# Patient Record
Sex: FEMALE
[De-identification: names, ages, dates, MRNs, and addresses within clinical notes are randomized per-mention and may not be internally consistent; named-entity substitution may affect disease eponyms.]

---

## 2020-08-02 ENCOUNTER — HOSPITAL ENCOUNTER (INPATIENT)
Dept: HOSPITAL 97 - 2ND | Age: 50
LOS: 2 days | Discharge: HOME | DRG: 445 | End: 2020-08-04
Attending: SURGERY | Admitting: SURGERY
Payer: COMMERCIAL

## 2020-08-02 VITALS — BODY MASS INDEX: 23.8 KG/M2

## 2020-08-02 DIAGNOSIS — R06.02: ICD-10-CM

## 2020-08-02 DIAGNOSIS — Z88.1: ICD-10-CM

## 2020-08-02 DIAGNOSIS — K80.00: Primary | ICD-10-CM

## 2020-08-02 DIAGNOSIS — J44.1: ICD-10-CM

## 2020-08-02 DIAGNOSIS — Z90.711: ICD-10-CM

## 2020-08-02 DIAGNOSIS — F17.210: ICD-10-CM

## 2020-08-02 DIAGNOSIS — Z79.899: ICD-10-CM

## 2020-08-02 DIAGNOSIS — N83.209: ICD-10-CM

## 2020-08-02 DIAGNOSIS — R50.9: ICD-10-CM

## 2020-08-02 DIAGNOSIS — Z20.828: ICD-10-CM

## 2020-08-02 LAB
ALBUMIN SERPL BCP-MCNC: 2.6 G/DL (ref 3.4–5)
ALP SERPL-CCNC: 160 U/L (ref 45–117)
ALT SERPL W P-5'-P-CCNC: 92 U/L (ref 12–78)
AST SERPL W P-5'-P-CCNC: 35 U/L (ref 15–37)
BLD SMEAR INTERP: (no result)
BUN BLD-MCNC: 8 MG/DL (ref 7–18)
CRP SERPL-MCNC: 97.3 MG/L (ref ?–3)
FERRITIN SERPL-MCNC: 295.5 NG/ML (ref 8–388)
GLUCOSE SERPLBLD-MCNC: 108 MG/DL (ref 74–106)
HCT VFR BLD CALC: 31.6 % (ref 36–45)
LYMPHOCYTES # SPEC AUTO: 0.8 K/UL (ref 0.7–4.9)
MORPHOLOGY BLD-IMP: (no result)
PMV BLD: 7.7 FL (ref 7.6–11.3)
POTASSIUM SERPL-SCNC: 3.6 MMOL/L (ref 3.5–5.1)
RBC # BLD: 3.43 M/UL (ref 3.86–4.86)
UA COMPLETE W REFLEX CULTURE PNL UR: (no result)
UA DIPSTICK W REFLEX MICRO PNL UR: (no result)

## 2020-08-02 PROCEDURE — 87070 CULTURE OTHR SPECIMN AEROBIC: CPT

## 2020-08-02 PROCEDURE — 76705 ECHO EXAM OF ABDOMEN: CPT

## 2020-08-02 PROCEDURE — 36415 COLL VENOUS BLD VENIPUNCTURE: CPT

## 2020-08-02 PROCEDURE — 85025 COMPLETE CBC W/AUTO DIFF WBC: CPT

## 2020-08-02 PROCEDURE — 87804 INFLUENZA ASSAY W/OPTIC: CPT

## 2020-08-02 PROCEDURE — 86140 C-REACTIVE PROTEIN: CPT

## 2020-08-02 PROCEDURE — 85652 RBC SED RATE AUTOMATED: CPT

## 2020-08-02 PROCEDURE — 87081 CULTURE SCREEN ONLY: CPT

## 2020-08-02 PROCEDURE — 81003 URINALYSIS AUTO W/O SCOPE: CPT

## 2020-08-02 PROCEDURE — 71045 X-RAY EXAM CHEST 1 VIEW: CPT

## 2020-08-02 PROCEDURE — 85379 FIBRIN DEGRADATION QUANT: CPT

## 2020-08-02 PROCEDURE — 80053 COMPREHEN METABOLIC PANEL: CPT

## 2020-08-02 PROCEDURE — 81015 MICROSCOPIC EXAM OF URINE: CPT

## 2020-08-02 PROCEDURE — 71275 CT ANGIOGRAPHY CHEST: CPT

## 2020-08-02 PROCEDURE — 87040 BLOOD CULTURE FOR BACTERIA: CPT

## 2020-08-02 PROCEDURE — 87088 URINE BACTERIA CULTURE: CPT

## 2020-08-02 PROCEDURE — 82728 ASSAY OF FERRITIN: CPT

## 2020-08-02 PROCEDURE — 84145 PROCALCITONIN (PCT): CPT

## 2020-08-02 PROCEDURE — 70450 CT HEAD/BRAIN W/O DYE: CPT

## 2020-08-02 PROCEDURE — 87086 URINE CULTURE/COLONY COUNT: CPT

## 2020-08-02 RX ADMIN — ENOXAPARIN SODIUM SCH MG: 60 INJECTION SUBCUTANEOUS at 18:24

## 2020-08-02 RX ADMIN — ONDANSETRON PRN MG: 2 INJECTION INTRAMUSCULAR; INTRAVENOUS at 05:59

## 2020-08-02 RX ADMIN — SODIUM CHLORIDE AND POTASSIUM CHLORIDE SCH MLS: .9; .15 SOLUTION INTRAVENOUS at 13:53

## 2020-08-02 RX ADMIN — SODIUM CHLORIDE AND POTASSIUM CHLORIDE SCH: .9; .15 SOLUTION INTRAVENOUS at 12:59

## 2020-08-02 RX ADMIN — ACETAMINOPHEN PRN MG: 500 TABLET, FILM COATED ORAL at 21:05

## 2020-08-02 RX ADMIN — PIPERACILLIN SODIUM AND TAZOBACTAM SODIUM SCH MLS: 3; .375 INJECTION, POWDER, LYOPHILIZED, FOR SOLUTION INTRAVENOUS at 06:04

## 2020-08-02 RX ADMIN — SODIUM CHLORIDE AND POTASSIUM CHLORIDE SCH MLS: .9; .15 SOLUTION INTRAVENOUS at 04:13

## 2020-08-02 RX ADMIN — MOMETASONE FUROATE AND FORMOTEROL FUMARATE DIHYDRATE SCH PUFF: 100; 5 AEROSOL RESPIRATORY (INHALATION) at 21:03

## 2020-08-02 RX ADMIN — PIPERACILLIN SODIUM AND TAZOBACTAM SODIUM SCH MLS: 3; .375 INJECTION, POWDER, LYOPHILIZED, FOR SOLUTION INTRAVENOUS at 23:06

## 2020-08-02 RX ADMIN — PIPERACILLIN SODIUM AND TAZOBACTAM SODIUM SCH MLS: 3; .375 INJECTION, POWDER, LYOPHILIZED, FOR SOLUTION INTRAVENOUS at 18:23

## 2020-08-02 RX ADMIN — ACETAMINOPHEN PRN MG: 500 TABLET, FILM COATED ORAL at 12:39

## 2020-08-02 RX ADMIN — SODIUM CHLORIDE AND POTASSIUM CHLORIDE SCH MLS: .9; .15 SOLUTION INTRAVENOUS at 23:06

## 2020-08-02 RX ADMIN — PIPERACILLIN SODIUM AND TAZOBACTAM SODIUM SCH MLS: 3; .375 INJECTION, POWDER, LYOPHILIZED, FOR SOLUTION INTRAVENOUS at 12:12

## 2020-08-02 RX ADMIN — FAMOTIDINE SCH MG: 10 INJECTION, SOLUTION INTRAVENOUS at 08:00

## 2020-08-02 NOTE — RAD REPORT
EXAM DESCRIPTION:  US - Abdomen Exam Limited - 8/2/2020 9:28 am

 

CLINICAL HISTORY:  Abdominal pain.

 

COMPARISON:  None.

 

FINDINGS:   The gallbladder wall is borderline thickened. . A gallstone is not seen.

 

The biliary tree is normal caliber.

 

IMPRESSION:  Borderline thickening of the gallbladder wall. A gallstone is not visualized

## 2020-08-02 NOTE — RAD REPORT
EXAM DESCRIPTION:  [place] Single View8/2/2020 1:59 pm

 

CLINICAL HISTORY:  fever

 

COMPARISON:  none

 

FINDINGS:   Lung bases are mildly hazy

 

The heart is normal size

 

IMPRESSION:   Lung bases are mildly hazy which may indicate pneumonia

## 2020-08-02 NOTE — P.CNS
Date of Consult: 08/02/20


Reason for Consult: SOB


Requesting Physician: Hugh Duvall


Chief Complaint: Acute cholecystitis


History of Present Illness: 





50-year-old female with a past medical history of smoking cigarettes x1 pack per

day for greater than 30 years, depression and cardiomyopathy 13 years ago after 

the birth of her twins presented to the out test ER with complaints of abdominal

pain. Described it as "pain" in the right upper quadrant.  No radiation.  

Described the pain between a 7 and 8/10.  Patient was sent to [facility] with 

concerns for acute cholecystitis with cholelithiasis.  CT abdomen pelvis year 

showed some mild ascitic fluid.  Ultrasound showed a gallbladder wall thickening

but no stones or sludge.  Patient was started on IV Zosyn. 


Per surgery-Dr. Galdamez.  Initially this patient was thought to have acute 

cholecystitis with cholelithiasis.  However she has some other symptoms that 

could be consistent with covid.  Patient is on IV antibiotic and pain medicine. 

Will give her regular diet, make her NPO at midnight, and re-evaluate in the 

a.m.





On examination patient is eating a regular diet.  She is not wearing her oxygen 

despite having room air saturations of 86%.  Patient is requiring between 2-4 L 

nasal cannula to maintain oxygen saturations greater than 93%.  States that she 

just got back from the bathroom and was eating her food before putting the 

oxygen back on.  Was just informed that the patient is call the test is 

negative.  





Allergies





macrobid Allergy (Severe, Uncoded 08/02/20 03:43)


   Itching/Hives/Rash





Home Medications: 








Ciprofloxacin HCl [Cipro 500 MG Tablet] 1 tab PO BID 08/02/20 


Escitalopram Oxalate [Lexapro] 1 tab PO DAILY 08/02/20 


Montelukast [Singulair*] 1 tab PO DAILY 08/02/20 








- Past Medical/Surgical History


Diabetic: No


-: smoker


-: cardiomyopathy 13 years ago after delivery


-: depression


-: allergic rhinitis


-: menopausal symptom


-: 2x CS


-: right thumb surgery


-: left eye mesh plate placement


-: toe surgery on юлия feet


-: partial hysterectomy


Psychosocial/ Personal History: Lives at home





- Family History


  ** Sister


Medical History: Cancer


Notes: breast





- Social History


Smoking Status: Current every day smoker


Alcohol use: Yes


CD- Drugs: No


Caffeine use: No


Place of Residence: Home





Review of Systems


General: Other (Headache), As per HPI


Eyes: Unremarkable


ENT: Unremarkable


Respiratory: Shortness of Breath, As per HPI


Cardiovascular: Unremarkable


Gastrointestinal: Abdominal Pain, No Distention


Genitourinary: Unremarkable


Musculoskeletal: Unremarkable


Integumentary: Unremarkable


Neurological: Unremarkable


Lymphatics: Unremarkable





Physical Examination














Temp Pulse Resp BP Pulse Ox


 


 98.8 F   96 H  20   115/62   92 


 


 08/02/20 13:39  08/02/20 11:28  08/02/20 11:28  08/02/20 11:28  08/02/20 11:28








General: Alert, In no apparent distress, Oriented x3


HEENT: Atraumatic, Normocephalic, PERRLA, Mucous membr. moist/pink


Neck: Supple, No Thyromegaly, Other (Trachea midline)


Respiratory: Clear to auscultation bilaterally, Diminished


Cardiovascular: No edema, Normal pulses, Normal S1 S2


Capillary refill: <2 Seconds


Gastrointestinal: Normal bowel sounds, Soft and benign, Non-distended, Ten

derness (Epigastric - mild pain with palpation)


Musculoskeletal: No swelling, No contractures, No erythema


Integumentary: No breakdown, No significant lesion, No tenderness/swelling


Neurological: Normal gait, Normal speech, Normal strength at 5/5 x4 extr, Normal

 tone


Laboratory Data (last 24 hrs)





08/02/20 13:14: Sodium 138, Potassium 3.6, BUN 8, Creatinine 0.54 L, Glucose 108

 H, Total Bilirubin 0.5, AST 35, ALT 92 H, Alkaline Phosphatase 160 H


08/02/20 13:14: WBC 10.4, Hgb 10.5 L, Hct 31.6 L, Plt Count 276





Conclusions/Impression: 





Impression:


Acute cholecystitis with cholelithiasis:


Shortness of breath:


Likely undiagnosed COPD:


Nicotine abuse:


History of cardiomyopathy after the birth of twins 13 years ago:


Large ovarian cyst:





Plan:


Acute cholecystitis with cholelithiasis:  Managed by surgery.  Less likely acute

 cholecystitis.  Surgery started patient on p.o. diet, NPO after midnight and Re

 evaluation in the morning.


Shortness of breath:  Etiology unclear but likely a COPD exacerbation.  Patient 

does have a history of cigarette smoking of 1 pack per day for greater than 30 

years.  This could be underlying undiagnosed COPD.  Patient does not use home 

oxygen at this time.  She does not take inhalers.  Due to her history of smoking

 will order a CT to rule out PE.  Was just informed that her COVID test was 

negative.  Patient's CRP is elevated at 97.3. Will start patient on inhaler 

Dulera and albuterol, will statrt prednisone 20 mg b.i.d. and O2 support.  

Currently she is 86% on room air and was having a difficult time maintaining oxy

gen saturations above 93% at 4 L nasal cannula.  Patient states she was doing 

fine yesterday.  States that at rest she does okay with exertion she feels like 

she can't catch her breath.  Due to the possibility of surgery tomorrow will 

hold off on anticoagulation until we get results of CT and a D-dimer.  Will also

 screen for flu, strep due to fever. 


Likely undiagnosed COPD:  Will further evaluate as above.  Will continue O2 

support and breathing treatments as needed. 


Nicotine abuse:  1 pack-a-day smoker of cigarettes for greater than 30 years.  

Offered nicotine patch but refused.  Will offer again tomorrow


History of cardiomyopathy after the birth of twins 13 years ago: Will further 

workup patient with an echocardiogram due to her history of cardiomyopathy 13 

years ago after the birth of her twins and possibly undiagnosed COPD from many 

years of smoking.  Will also start on prednisone 20 mg b.i.d., Dulera and 

albuterol inhalers for possible underlying COPD.  Prior to arriving to the 

hospital patient had an EKG which showed normal sinus rhythm at a rate of 65. No

 acute pathology.


Large ovarian cyst:  CT showed an incidental finding of a large ovarian cyst 

approximately 7.7 cm large.  Patient will need follow-up with gyn for further 

workup after discharge.


Time Spent Managing Pts care (In Minutes): 55

## 2020-08-02 NOTE — P.HP
Date of Service: 08/02/20


PC:  This patient was transferred to our facility with a presumptive diagnosis 

of acute cholecystitis with cholelithiasis.





HPC:  The she presented to a local emergency room. Patient was complaining of 

abdominal pain. Apparently also had some lower backache and just felt on well a 

few days prior.








PSHx:  NAD





SOC:  Allergic to Macrobid





SYS REVIEW:  No cough, no wheeze or shortness of breath.  Did have some diarrhea

throughout the week.  Denies any urinary complaints.  Has also had a headache as

well.





O/E awake alert vital signs are stable, T-max 103








HEENT:  Not jaundice





Chest:  Chest movement equal bilaterally





ABD:  Minimal right upper quadrant tenderness





LOCO:  Intact





DATA:  Did have elevated liver enzymes and LP stays as well as gamma GT.  

Abdominal CT scan shows some ascitic fluid.  No free air was noted. Ultrasound 

today shows some gallbladder wall thickening but no stones are seen.





IMPRESSION:  Initially this patient was thought to have acute cholecystitis with

cholelithiasis.  However she has some other symptoms that could be consistent 

with covid.  Patient is on IV and pain medicine.  Will give her regular diet, 

make her NPO at midnight, and re-evaluate in the a.m.





PLAN:  Transferred to the covid floor.  Consult hospitalist.

## 2020-08-03 LAB
ALBUMIN SERPL BCP-MCNC: 2.8 G/DL (ref 3.4–5)
ALP SERPL-CCNC: 160 U/L (ref 45–117)
ALT SERPL W P-5'-P-CCNC: 96 U/L (ref 12–78)
AST SERPL W P-5'-P-CCNC: 36 U/L (ref 15–37)
BUN BLD-MCNC: 8 MG/DL (ref 7–18)
GLUCOSE SERPLBLD-MCNC: 137 MG/DL (ref 74–106)
HCT VFR BLD CALC: 32.1 % (ref 36–45)
LYMPHOCYTES # SPEC AUTO: 0.7 K/UL (ref 0.7–4.9)
PMV BLD: 7.7 FL (ref 7.6–11.3)
POTASSIUM SERPL-SCNC: 3.8 MMOL/L (ref 3.5–5.1)
RBC # BLD: 3.51 M/UL (ref 3.86–4.86)

## 2020-08-03 RX ADMIN — SODIUM CHLORIDE AND POTASSIUM CHLORIDE SCH MLS: .9; .15 SOLUTION INTRAVENOUS at 19:54

## 2020-08-03 RX ADMIN — ACETAMINOPHEN PRN MG: 500 TABLET, FILM COATED ORAL at 08:13

## 2020-08-03 RX ADMIN — MOMETASONE FUROATE AND FORMOTEROL FUMARATE DIHYDRATE SCH PUFF: 100; 5 AEROSOL RESPIRATORY (INHALATION) at 08:23

## 2020-08-03 RX ADMIN — SODIUM CHLORIDE AND POTASSIUM CHLORIDE SCH MLS: .9; .15 SOLUTION INTRAVENOUS at 11:09

## 2020-08-03 RX ADMIN — MOMETASONE FUROATE AND FORMOTEROL FUMARATE DIHYDRATE SCH PUFF: 100; 5 AEROSOL RESPIRATORY (INHALATION) at 19:54

## 2020-08-03 RX ADMIN — PIPERACILLIN SODIUM AND TAZOBACTAM SODIUM SCH MLS: 3; .375 INJECTION, POWDER, LYOPHILIZED, FOR SOLUTION INTRAVENOUS at 05:28

## 2020-08-03 RX ADMIN — ENOXAPARIN SODIUM SCH MG: 60 INJECTION SUBCUTANEOUS at 08:13

## 2020-08-03 RX ADMIN — PIPERACILLIN SODIUM AND TAZOBACTAM SODIUM SCH MLS: 3; .375 INJECTION, POWDER, LYOPHILIZED, FOR SOLUTION INTRAVENOUS at 11:09

## 2020-08-03 RX ADMIN — ACETAMINOPHEN PRN MG: 500 TABLET, FILM COATED ORAL at 13:30

## 2020-08-03 RX ADMIN — ENOXAPARIN SODIUM SCH MG: 60 INJECTION SUBCUTANEOUS at 19:53

## 2020-08-03 RX ADMIN — NYSTATIN SCH UNIT: 100000 SUSPENSION ORAL at 16:59

## 2020-08-03 RX ADMIN — CEFTRIAXONE SCH MLS: 1 INJECTION, SOLUTION INTRAVENOUS at 16:59

## 2020-08-03 RX ADMIN — ACETAMINOPHEN PRN MG: 500 TABLET, FILM COATED ORAL at 03:46

## 2020-08-03 RX ADMIN — NYSTATIN SCH: 100000 SUSPENSION ORAL at 19:55

## 2020-08-03 RX ADMIN — FAMOTIDINE SCH MG: 10 INJECTION, SOLUTION INTRAVENOUS at 08:13

## 2020-08-03 RX ADMIN — ACETAMINOPHEN PRN MG: 500 TABLET, FILM COATED ORAL at 19:54

## 2020-08-03 NOTE — P.CNS
Date of Consult: 08/03/20


Reason for Consult: possible COVID infection


Chief Complaint: FEver and headache


History of Present Illness: 





Patient is 50 years of age has been sick for about 2 weeks complaining of a 

headache some back pain nonspecific symptoms having a running some fever went to

the urgent care was found to be negative was transferred here possibility of for

cholecystitis she is currently doing well no abdominal pain her pain has was all

denies any shortness of breath


Allergies





macrobid Allergy (Severe, Uncoded 08/02/20 03:43)


   Itching/Hives/Rash





Home Medications: 








Ciprofloxacin HCl [Cipro 500 MG Tablet] 1 tab PO BID 08/02/20 


Escitalopram Oxalate [Lexapro] 1 tab PO DAILY 08/02/20 


Montelukast [Singulair*] 1 tab PO DAILY 08/02/20 








- Past Medical/Surgical History


Diabetic: No


-: smoker


-: cardiomyopathy 13 years ago after delivery


-: depression


-: allergic rhinitis


-: menopausal symptom


-: 2x CS


-: right thumb surgery


-: left eye mesh plate placement


-: toe surgery on юлия feet


-: partial hysterectomy


Psychosocial/ Personal History: Lives at home





- Family History


  ** Sister


Medical History: Cancer


Notes: breast





- Social History


Smoking Status: Current every day smoker


Alcohol use: Yes


CD- Drugs: No


Caffeine use: No


Place of Residence: Home





Review of Systems


10-point ROS is otherwise unremarkable





Physical Examination














Temp Pulse Resp BP Pulse Ox


 


 97 F   64   19   114/66   95 


 


 08/03/20 12:00  08/03/20 12:00  08/03/20 12:00  08/03/20 12:00  08/03/20 12:00








Laboratory Data (last 24 hrs)





08/03/20 10:28: Sodium 142, Potassium 3.8, BUN 8, Creatinine 0.43 L, Glucose 137

 H, Total Bilirubin 0.3, AST 36, ALT 96 H, Alkaline Phosphatase 160 H


08/03/20 10:28: WBC 6.2  D, Hgb 10.9 L, Hct 32.1 L, Plt Count 325








- Problems


(1) COVID-19 ruled out by clinical criteria


Current Visit: Yes   Status: Acute   


Plan: 


Patient is 50 years of age admitted with nonspecific symptoms of headaches and 

fever for the past couple of weeks corona virus test is negative although D-

dimer and CRP were elevated CT scan of the chest minimal changes does no 

evidence of interstitial pneumonia urinalysis also negative cultures are also 

negative seen by general surgery doubt cholecystitis patient does have COPD 

changes she is an active smoker although she denies any shortness of breath 

saturations vital signs satisfactory I recommend discharging her on some low-

dose prednisone 10 mg twice a day for 7 days and to follow up with me for 

evaluation for COPD discuss with Dr. Duvall patient denies any symptoms of 

congestive heart failure

## 2020-08-03 NOTE — RAD REPORT
EXAM DESCRIPTION:  CT - Chest For Pe Angio - 8/3/2020 3:30 am

 

CLINICAL HISTORY:  The patient is 50 years old and is Female; Acute onset shortness of breath

 

TECHNIQUE:  Axial computed tomographic angiography images of the chest with intravenous contrast.   S
agittal and coronal reformatted images were created and reviewed.   This CT exam was performed using 
one or more of the following dose reduction techniques:   automated exposure control, adjustment of t
he mA and/or kV according to patient size, and/or use of iterative reconstruction technique.

   MIP reconstructed images were created and reviewed.

 

COMPARISON:  No relevant prior studies available.

 

FINDINGS:  PULMONARY ARTERIES:   There are no obvious filling defects identified within the pulmonary
 arteries to suggest pulmonary embolism.

   AORTA:   No acute findings.   No thoracic aortic aneurysm.

   LUNGS:   The lungs are hyperinflated with bilateral centrilobular emphysematous change. Dependent 
atelectasis within the lung bases is noted, right greater than left.   No mass.

   PLEURAL SPACE:   Small bilateral pleural effusions are present.   No pneumothorax.

   HEART:   A trace pericardial effusion is present.   No evidence of RV dysfunction.

   BONES/JOINTS:   No acute fracture.   No dislocation.

   SOFT TISSUES:   Unremarkable.

   LYMPH NODES:   Unremarkable.   No enlarged lymph nodes.

 

IMPRESSION:  1.   No evidence of pulmonary embolism.

2.   Bilateral pleural effusions with compressive atelectasis, right greater than left.

3.   Emphysematous changes of the lungs.

 

Electronically signed by:   Brandy Crespo MD   8/3/2020 1:42 AM CDT Workstation: [id number]

 

 

 

Due to temporary technical issues with the PACS/Fluency reporting system, reports are being signed by
 the in house radiologist without review as a courtesy to ensure prompt reporting. The interpreting r
adiologist is fully responsible for the content of the report.

## 2020-08-03 NOTE — P.PN
Subjective


Date of Service: 08/03/20


Chief Complaint: Acute cholecystitis


Subjective: No new changes





Review of Systems


10-point ROS is otherwise unremarkable


General: Fever, Chills


Gastrointestinal: Abdominal Pain


Neurological: Other (Headache)





Physical Examination





- Vital Signs


Temperature: 97.2 F


Blood Pressure: 110/64


Pulse: 60


Respirations: 21


Pulse Ox (%): 94





- Physical Exam


General: Alert, In no apparent distress, Oriented x3


HEENT: Atraumatic, Normocephalic


Neck: Supple


Respiratory: Clear to auscultation bilaterally, Normal air movement


Cardiovascular: No edema, Regular rate/rhythm, Normal S1 S2


Gastrointestinal: Tenderness (Right upper quadrant and epigastric tenderness 

without guarding or rebound tenderness.)


Musculoskeletal: No erythema, No tenderness, No warmth


Integumentary: No significant lesion, No tenderness/swelling, No erythema


Neurological: Normal speech, Normal tone, Sensation intact





- Studies


Laboratory Data (last 24 hrs)





08/02/20 13:14: Sodium 138, Potassium 3.6, BUN 8, Creatinine 0.54 L, Glucose 108

H, Total Bilirubin 0.5, AST 35, ALT 92 H, Alkaline Phosphatase 160 H


08/02/20 13:14: WBC 10.4, Hgb 10.5 L, Hct 31.6 L, Plt Count 276





Microbiology Data (last 24 hrs): 








08/02/20 15:45   Throat   Group A Streptococcus Rapid Screen - Final


08/02/20 12:30   Nasopharnyx   Influenza Type A Antigen Screen - Final


08/02/20 12:30   Nasopharnyx   Influenza Type B Antigen Screen - Final


08/02/20 04:30   Nasopharnyx   Coronavirus COVID-19 PCR - Final








Assessment & Plan


Discharge Plan: Home


Plan to discharge in: 24 Hours





- Code Status/Comfort Care


Code Status Assessed: Yes


Physician Review Additional Text: 





Impression:


Acute cholecystitis with cholelithiasis:


Shortness of breath:


Likely undiagnosed COPD:


Nicotine abuse:


History of cardiomyopathy after the birth of twins 13 years ago:


Large ovarian cyst:





Plan:


Acute cholecystitis: Managed by surgery.  Less likely acute cholecystitis.  

Patient remained NPO last night.  Awaiting re-evaluation from surgery today.  

Patient still with mild right upper quadrant and epigastric tenderness.


Shortness of breath:  Etiology unclear but likely a COPD exacerbation.  Patient 

does have a history of cigarette smoking of 1 pack per day for greater than 30 

years.  This could be underlying undiagnosed COPD.  Patient does not use home 

oxygen at this time.  She does not take inhalers.  CT PE protocol was obtained 

and negative for pulmonary embolism.  Patient had repeat COVID test and is 

currently awaiting results.Will start patient on inhaler Dulera and albuterol, 

will statrt prednisone 20 mg b.i.d. and O2 support.  Saturations have improved 

today.  Will continue to monitor closely.  Patient states she was doing fine 

yesterday.  States that at rest she does okay with exertion she feels like she 

can't catch her breath.  Will continue to hold anticoagulation at this time 

until cleared by surgery. 


Likely undiagnosed COPD:  Will further evaluate as above.  Will continue O2 

support and breathing treatments as needed. 


Nicotine abuse:  1 pack-a-day smoker of cigarettes for greater than 30 years.  

Offered nicotine patch but refused.  Will offer again tomorrow


History of cardiomyopathy after the birth of twins 13 years ago: Will further 

workup patient with an echocardiogram due to her history of cardiomyopathy 13 

years ago after the birth of her twins and possibly undiagnosed COPD from many 

years of smoking.  Will also start on prednisone 20 mg b.i.d., Dulera and 

albuterol inhalers for possible underlying COPD.  Prior to arriving to the 

hospital patient had an EKG which showed normal sinus rhythm at a rate of 65. No

acute pathology.  Patient went to follow up with Cardiology.


Large ovarian cyst:  CT showed an incidental finding of a large ovarian cyst 

approximately 7.7 cm large.  Patient will need follow-up with gyn for further 

workup after discharge.


Critical Care: No


Time Spent Managing Pts Care (In Minutes): 55

## 2020-08-03 NOTE — P.PN
Date of Service: 08/03/20





S:  Patient feels slightly better today.  Still has headache.  Complains of some

upper abdominal discomfort.  Still has feelings of shortness of breath.  Also 

says her abdomen appears to be getting bigger, and slightly more distended.





O:  Vital signs are stable, abdomen is soft, mild upper abdominal tenderness. 

Mildly tympanic.  No guarding or rebound.





A:  Patient complaining of any headaches.  (CT scan was negative) still has mild

shortness of breath.  Abdomen remains nonsurgical.





P:  Patient is not going to require surgical intervention on this hospital stay.

 She may have a full diet.  Apparently is waiting for AE echocardiogram.  Will 

will discuss the results with patient in a.m. patient may be placed on blood 

thinners if indicated.

## 2020-08-03 NOTE — RAD REPORT
EXAM DESCRIPTION:  CT - Head Brain Wo Cont - 8/3/2020 9:33 am

 

CLINICAL HISTORY:  headache

 

COMPARISON:  Chest Single View dated 8/2/2020

 

TECHNIQUE:  Axial 5 mm thick images of the head were obtained without IV contrast.

 

All CT scans are performed using dose optimization technique as appropriate and may include automated
 exposure control or mA/KV adjustment according to patient size.

 

FINDINGS:  No intracranial hemorrhage, mass, edema or shift of mid-line structures. No acute infarcti
on changes seen. No abnormal extra-axial fluid collections. Ventricles are normal.

 

Mastoid air cells and visualized portions of the paranasal sinuses are clear.

 

No acute bony findings. Postsurgical changes are noted to the floor of the left orbit. No acute globe
 or orbital content abnormality.

 

 

IMPRESSION:  Negative noncontrast CT examination for acute or significant finding.

 

No focal neurologic deficit was detailed. If there are ongoing clinical concerns, follow-up MR imagin
g could be performed.

## 2020-08-04 VITALS — DIASTOLIC BLOOD PRESSURE: 66 MMHG | TEMPERATURE: 97.4 F | SYSTOLIC BLOOD PRESSURE: 128 MMHG

## 2020-08-04 VITALS — OXYGEN SATURATION: 97 %

## 2020-08-04 LAB
ALBUMIN SERPL BCP-MCNC: 2.4 G/DL (ref 3.4–5)
ALP SERPL-CCNC: 131 U/L (ref 45–117)
ALT SERPL W P-5'-P-CCNC: 78 U/L (ref 12–78)
AST SERPL W P-5'-P-CCNC: 26 U/L (ref 15–37)
BUN BLD-MCNC: 10 MG/DL (ref 7–18)
GLUCOSE SERPLBLD-MCNC: 148 MG/DL (ref 74–106)
HCT VFR BLD CALC: 28.6 % (ref 36–45)
LYMPHOCYTES # SPEC AUTO: 1 K/UL (ref 0.7–4.9)
PMV BLD: 8 FL (ref 7.6–11.3)
POTASSIUM SERPL-SCNC: 4.6 MMOL/L (ref 3.5–5.1)
RBC # BLD: 3.12 M/UL (ref 3.86–4.86)

## 2020-08-04 RX ADMIN — ACETAMINOPHEN PRN MG: 500 TABLET, FILM COATED ORAL at 16:24

## 2020-08-04 RX ADMIN — ACETAMINOPHEN PRN MG: 500 TABLET, FILM COATED ORAL at 02:30

## 2020-08-04 RX ADMIN — MOMETASONE FUROATE AND FORMOTEROL FUMARATE DIHYDRATE SCH PUFF: 100; 5 AEROSOL RESPIRATORY (INHALATION) at 09:00

## 2020-08-04 RX ADMIN — NYSTATIN SCH UNIT: 100000 SUSPENSION ORAL at 09:17

## 2020-08-04 RX ADMIN — CEFTRIAXONE SCH MLS: 1 INJECTION, SOLUTION INTRAVENOUS at 09:17

## 2020-08-04 RX ADMIN — SODIUM CHLORIDE AND POTASSIUM CHLORIDE SCH MLS: .9; .15 SOLUTION INTRAVENOUS at 05:18

## 2020-08-04 RX ADMIN — FAMOTIDINE SCH MG: 10 INJECTION, SOLUTION INTRAVENOUS at 09:17

## 2020-08-04 RX ADMIN — ONDANSETRON PRN MG: 2 INJECTION INTRAMUSCULAR; INTRAVENOUS at 05:16

## 2020-08-04 RX ADMIN — ENOXAPARIN SODIUM SCH MG: 60 INJECTION SUBCUTANEOUS at 09:17

## 2020-08-04 RX ADMIN — ACETAMINOPHEN PRN MG: 500 TABLET, FILM COATED ORAL at 10:01

## 2020-08-04 RX ADMIN — SODIUM CHLORIDE AND POTASSIUM CHLORIDE SCH: .9; .15 SOLUTION INTRAVENOUS at 15:00

## 2020-08-04 RX ADMIN — NYSTATIN SCH UNIT: 100000 SUSPENSION ORAL at 15:08

## 2020-08-04 NOTE — P.DS
Admission Date: 08/02/20


Discharge Date: 08/04/20


Disposition: ROUTINE DISCHARGE


Discharge Condition: FAIR


Reason for Admission: Suspected cholecystitis, COPD


Consultations: 





General surgery Dr. Duvall





Pulmonology: Dr. Pires


Procedures: 





CT PE protocol


FINDINGS:  PULMONARY ARTERIES:   There are no obvious filling defects identified

within the pulmonary arteries to suggest pulmonary embolism.


   AORTA:   No acute findings.   No thoracic aortic aneurysm.


   LUNGS:   The lungs are hyperinflated with bilateral centrilobular 

emphysematous change. Dependent atelectasis within the lung bases is noted, 

right greater than left.   No mass.


   PLEURAL SPACE:   Small bilateral pleural effusions are present.   No 

pneumothorax.


   HEART:   A trace pericardial effusion is present.   No evidence of RV 

dysfunction.


   BONES/JOINTS:   No acute fracture.   No dislocation.


   SOFT TISSUES:   Unremarkable.


   LYMPH NODES:   Unremarkable.   No enlarged lymph nodes.


IMPRESSION:  1.   No evidence of pulmonary embolism.


2.   Bilateral pleural effusions with compressive atelectasis, right greater 

than left.


3.   Emphysematous changes of the lungs.


Electronically signed by:   Brandy Crespo MD   8/3/2020 1:42 AM CDT Workstation:

[id number]


Chest x-ray


FINDINGS:   Lung bases are mildly hazy


The heart is normal size


IMPRESSION:   Lung bases are mildly hazy which may indicate pneumonia


CT head without contrast


FINDINGS:  No intracranial hemorrhage, mass, edema or shift of mid-line 

structures. No acute infarction changes seen. No abnormal extra-axial fluid 

collections. Ventricles are normal.


Mastoid air cells and visualized portions of the paranasal sinuses are clear.


No acute bony findings. Postsurgical changes are noted to the floor of the left 

orbit. No acute globe or orbital content abnormality.


IMPRESSION:  Negative noncontrast CT examination for acute or significant 

finding.


No focal neurologic deficit was detailed. If there are ongoing clinical 

concerns, follow-up MR imaging could be performed.





Ultrasound abdomen


FINDINGS:   The gallbladder wall is borderline thickened. . A gallstone is not 

seen.


The biliary tree is normal caliber.


IMPRESSION:  Borderline thickening of the gallbladder wall. A gallstone is not 

visualized





Medical problem list


cholecystitis with without cholelithiasis- nonsurgical


Shortness of breath secondary to COPD


Nicotine abuse


History of cardiomyopathy after the birth of twins 13 years ago:


Large ovarian cyst








Brief History of Present Illness: 





Patient was seen as dental emergency department and diagnosed with suspected 

cholecystitis.  Patient was transferred to our facility for evaluation by 

general surgery.


Hospital Course: 





50-year-old  female with medical history of primarily tobacco abuse 1 

pack per day for the last 30 years was admitted for suspected cholecystitis.  

Patient was having right upper quadrant abdominal pain with some mild tenderness

and had an ultrasound that showed some wall thickening without any gallstones 

present.  Patient was evaluated by general surgery who believed that this did 

not require immediate surgical intervention and this can be followed up on 

outpatient basis.  During her stay, patient was noted to be slightly hypoxic and

was also worked up for this.  Patient saw pulmonology while she is in the 

hospital and had both chest x-ray and CT PE protocol to rule out pulmonary 

embolism.  Pulmonology believes the patient has underlying COPD which she has 

not been being treated for.  Pulmonology recommended discharge patient continue 

with prednisone 10 mg p.o. b.i.d. for 10 days in addition to Advair inhaler.  

Patient was 88% on room air today but not dyspneic or tachypneic at all.  

Pulmonology recommends qualifying patient for home oxygen therapy and close 

followup with pulmonology.  Patient had suspected urinary tract infection but 

culture came back negative, patient did receive IV antibiotics during her stay 

will not be discharged with any antibiotics this time.  Patient will also need 

to follow up with general surgery in the next 1-2 weeks to follow up this 

hospitalization.  Strict return precautions given for worsening abdominal pain, 

fevers, increasing shortness of breath.  Patient had initial COVID test which 

was negative that is had a repeat test collected the next day due to increasing 

and fever and shortness of breath.  This test is still pending.  For the 

meantime patient will be required to self quarantine until the results of these 

tests are available.


Vital Signs/Physical Exam: 














Temp Pulse Resp BP Pulse Ox


 


 98.9 F   52   20   120/59 L  93 


 


 08/04/20 08:00  08/04/20 08:00  08/04/20 08:00  08/04/20 08:00 08/04/20 08:00








General: Alert, In no apparent distress


HEENT: Atraumatic, PERRLA, EOMI


Neck: Supple, JVD not distended


Respiratory: Clear to auscultation bilaterally, Normal air movement


Cardiovascular: Regular rate/rhythm, Normal S1 S2


Gastrointestinal: Normal bowel sounds, No tenderness


Musculoskeletal: No tenderness


Integumentary: No rashes


Neurological: Normal speech, Normal tone, Normal affect


Lymphatics: No axilla or inguinal lymphadenopathy


Laboratory Data at Discharge: 














WBC  8.5 K/uL (4.3-10.9)  D 08/04/20  06:14    


 


Hgb  9.8 g/dL (12.0-15.0)  L  08/04/20  06:14    


 


Hct  28.6 % (36.0-45.0)  L  08/04/20  06:14    


 


Plt Count  348 K/uL (152-406)   08/04/20  06:14    


 


Sodium  142 mmol/L (136-145)   08/04/20  06:14    


 


Potassium  4.6 mmol/L (3.5-5.1)   08/04/20  06:14    


 


BUN  10 mg/dL (7-18)   08/04/20  06:14    


 


Creatinine  0.39 mg/dL (0.55-1.3)  L  08/04/20  06:14    


 


Glucose  148 mg/dL ()  H  08/04/20  06:14    


 


Total Bilirubin  0.2 mg/dL (0.2-1.0)   08/04/20  06:14    


 


AST  26 U/L (15-37)   08/04/20  06:14    


 


ALT  78 U/L (12-78)   08/04/20  06:14    


 


Alkaline Phosphatase  131 U/L ()  H  08/04/20  06:14    








Home Medications: 








Ciprofloxacin HCl [Cipro 500 MG Tablet] 1 tab PO BID 08/02/20 


Escitalopram Oxalate [Lexapro] 1 tab PO DAILY 08/02/20 


Montelukast [Singulair*] 1 tab PO DAILY 08/02/20 


predniSONE [Deltasone*] 10 mg PO BID #14 tab 08/03/20 


Fluticasone/Salmeterol [Advair Hfa 115-21 Mcg Inhaler] 12 gm IH BID #1 

hfa.[email or web address] 08/04/20 





New Medications: 


Fluticasone/Salmeterol [Advair Hfa 115-21 Mcg Inhaler] 12 gm IH BID #1 

hfa.[email or web address].[email or web address]


predniSONE [Deltasone*] 10 mg PO BID #14 tab


Patient Discharge Instructions: Please give the patient the Dulera from the 

hospital to take 1 or 2 puffs once a twice a day.  1.  You will need to follow 

up with general surgery in 1-2 weeks to follow up this hospitalization.  If your

abdominal pain becomes significantly worse please return for re-evaluation.  2. 

You need to follow up with pulmonology on an outpatient basis for evaluation of 

oxygen needs.  3. 50-year-old  female with medical history of primarily

tobacco abuse 1 pack per day for the last 30 years was admitted for suspected 

cholecystitis.  Patient was having right upper quadrant abdominal pain with some

mild tenderness and had an ultrasound that showed some wall thickening without 

any gallstones present.  Patient was evaluated by general surgery who believed 

that this did not require immediate surgical intervention and this can be 

followed up on outpatient basis.  During her stay, patient was noted to be slig

htly hypoxic and was also worked up for this.  Patient saw pulmonology while she

is in the hospital and had both chest x-ray and CT PE protocol to rule out 

pulmonary embolism.  Pulmonology believes the patient has underlying COPD which 

she has not been being treated for.  Pulmonology recommended discharge patient 

continue with prednisone 10 mg p.o. b.i.d. for 10 days in addition to Advair 

inhaler.  Patient was 88% on room air today but not dyspneic or tachypneic at 

all.  Pulmonology recommends qualifying patient for home oxygen therapy and 

close followup with pulmonology.  Patient had suspected urinary tract infection 

but culture came back negative, patient did receive IV antibiotics during her 

stay will not be discharged with any antibiotics this time.  Patient will also 

need to follow up with general surgery in the next 1-2 weeks to follow up this 

hospitalization.  Strict return precautions given for worsening abdominal pain, 

fevers, increasing shortness of breath.  Patient had initial COVID test which 

was negative that is had a repeat test collected the next day due to increasing 

and fever and shortness of breath.  This test is still pending.  For the 

meantime patient will be required to self quarantine until the results of these 

tests are available.


Diet: Regular


Activity: Ad myke


Followup: 


Tom Pires MD [ACTIVE - CAN ADMIT] - 


Hugh Duvall MD [ACTIVE - CAN ADMIT] - 


Time spent managing pt's care (in minutes): 55